# Patient Record
Sex: MALE | Race: WHITE | NOT HISPANIC OR LATINO | Employment: FULL TIME | URBAN - METROPOLITAN AREA
[De-identification: names, ages, dates, MRNs, and addresses within clinical notes are randomized per-mention and may not be internally consistent; named-entity substitution may affect disease eponyms.]

---

## 2019-03-11 ENCOUNTER — HOSPITAL ENCOUNTER (EMERGENCY)
Facility: HOSPITAL | Age: 39
Discharge: HOME | End: 2019-03-11
Attending: EMERGENCY MEDICINE
Payer: COMMERCIAL

## 2019-03-11 VITALS
HEIGHT: 67 IN | DIASTOLIC BLOOD PRESSURE: 59 MMHG | OXYGEN SATURATION: 94 % | RESPIRATION RATE: 17 BRPM | SYSTOLIC BLOOD PRESSURE: 120 MMHG | TEMPERATURE: 99 F | HEART RATE: 104 BPM | BODY MASS INDEX: 25.11 KG/M2 | WEIGHT: 160 LBS

## 2019-03-11 DIAGNOSIS — F41.0 PANIC ATTACK: ICD-10-CM

## 2019-03-11 DIAGNOSIS — F10.10 ALCOHOL ABUSE: Primary | ICD-10-CM

## 2019-03-11 PROCEDURE — 25000003 PHARM REV CODE 250: Performed by: EMERGENCY MEDICINE

## 2019-03-11 PROCEDURE — 96376 TX/PRO/DX INJ SAME DRUG ADON: CPT

## 2019-03-11 PROCEDURE — 96361 HYDRATE IV INFUSION ADD-ON: CPT

## 2019-03-11 PROCEDURE — 63600175 PHARM REV CODE 636 W HCPCS: Performed by: EMERGENCY MEDICINE

## 2019-03-11 PROCEDURE — 96374 THER/PROPH/DIAG INJ IV PUSH: CPT

## 2019-03-11 PROCEDURE — 99284 EMERGENCY DEPT VISIT MOD MDM: CPT | Mod: 25

## 2019-03-11 RX ORDER — DIAZEPAM 5 MG/1
5 TABLET ORAL EVERY 8 HOURS PRN
Qty: 5 TABLET | Refills: 0 | Status: SHIPPED | OUTPATIENT
Start: 2019-03-11

## 2019-03-11 RX ADMIN — SODIUM CHLORIDE 2000 ML: 0.9 INJECTION, SOLUTION INTRAVENOUS at 01:03

## 2019-03-11 RX ADMIN — LORAZEPAM 1 MG: 2 INJECTION INTRAMUSCULAR; INTRAVENOUS at 01:03

## 2019-03-11 RX ADMIN — LORAZEPAM 1 MG: 2 INJECTION INTRAMUSCULAR; INTRAVENOUS at 02:03

## 2019-03-11 NOTE — ED NOTES
"Pt presents to the ED via ems. Pt reports he has been drinking since being in town for the last week. Pt reports he boarded his flight today and became anxious. Pt reports he was denied an alcoholic beverage before take off, got off the plane and was transferred here by EMS. Pt reports his last drink was at 5pm. Pt denies SI but states "I keep imagining myself jumping off of something". Pt appears anxious.   "

## 2019-03-11 NOTE — ED PROVIDER NOTES
"Encounter Date: 3/11/2019    SCRIBE #1 NOTE: I, Josesito Ulloa, am scribing for, and in the presence of,  . I have scribed the entire note.       History     Chief Complaint   Patient presents with    Psychiatric Evaluation     38y M to ED via [facility] from [facility]. pt reports drinking every day x1 week. pt reports last drink at 1700, now feeling "jittery." pt also reports thoughts of "jumping off something" but does not want to hurt himself at this time.     Scottie Burch is a 38 y.o. male who  has no past medical history on file.    Patient states he is having "major panic attacks and my mind is out of control" He states today he had anxiety and drank alcohol to get over it. Patient does have anxiety, but does not take medication for it. Patient drinks heavily, but not daily. Patient has been drinking heavily, at times leading to blackouts, for the past week. He admits to drinking "all day" pta, last drink being 8 hours ago. He states he is in "physical pain" and this is not normal for him. He states he sees himself "jumping off something" but denies SI and "loves life and blah blah blah." He thinks the thought occurs when he is drunk. Patient denies the use of recreational drugs.       The history is provided by the patient.     Review of patient's allergies indicates:   Allergen Reactions    Amoxicillin     Erythromycin      No past medical history on file.  No past surgical history on file.  No family history on file.  Social History     Tobacco Use    Smoking status: Not on file   Substance Use Topics    Alcohol use: Not on file    Drug use: Not on file     Review of Systems   Constitutional: Negative for chills and fever.   HENT: Negative for facial swelling and trouble swallowing.    Eyes: Negative for redness.   Respiratory: Negative for shortness of breath.    Cardiovascular: Negative for chest pain.   Gastrointestinal: Negative for abdominal pain, diarrhea, nausea and vomiting. "   Genitourinary: Negative for dysuria and hematuria.   Musculoskeletal: Positive for myalgias. Negative for gait problem.   Skin: Negative for rash.   Neurological: Negative for facial asymmetry and speech difficulty.   Psychiatric/Behavioral: Positive for suicidal ideas.       Physical Exam     Initial Vitals [03/11/19 0110]   BP Pulse Resp Temp SpO2   127/89 99 -- 96.4 °F (35.8 °C) 97 %      MAP       --         Physical Exam    Nursing note and vitals reviewed.  Constitutional: He appears well-developed and well-nourished.   HENT:   Head: Normocephalic and atraumatic.   Eyes: Conjunctivae are normal.   Neck: Neck supple.   Cardiovascular: Normal rate, regular rhythm, normal heart sounds and intact distal pulses. Exam reveals no gallop and no friction rub.    No murmur heard.  Pulmonary/Chest: Breath sounds normal. He has no wheezes. He has no rhonchi. He has no rales.   Abdominal: Soft. He exhibits no distension. There is no tenderness.   Musculoskeletal: Normal range of motion.   Neurological: He is alert and oriented to person, place, and time.   Skin: No rash noted. No erythema.   Psychiatric: He has a normal mood and affect.         ED Course   Procedures  Labs Reviewed - No data to display       Imaging Results    None          Medical Decision Making:   ED Management:  I do not feel the patient is a threat of harm to himself or anyone else. No psychosis. He reports having the intrusive thought of jumping off of something but denies that it is associated with the notion of suicidal ideation and is unable to articulate why he is having this thought.  He does not feel that there is a risk of him harming himself.  His girlfriend is present and supports his reassurance that he is not suicidal.  She said that she will assist to ensure his safety and will call EMS if there is any threat that he may harm himself.  Pt reports feeling better after ativan and ivf.  I will discharge to the care of his girlfriend               Attending Attestation:           Physician Attestation for Scribe:  Physician Attestation Statement for Scribe #1: I, remington pastor, reviewed documentation, as scribed by sigrid norris in my presence, and it is both accurate and complete.                    Clinical Impression:     1. Alcohol abuse    2. Panic attack            Disposition:   Disposition: Discharged  Condition: Stable         I, Dr. Remington Pastor, personally performed the services described in this documentation. All medical record entries made by the scribe were at my direction and in my presence.  I have reviewed the chart and agree that the record reflects my personal performance and is accurate and complete. Remington Pastor MD MPH.  1:26 AM 03/11/2019                   Remington Pastor MD  03/11/19 0318